# Patient Record
(demographics unavailable — no encounter records)

---

## 2025-01-23 NOTE — HISTORY OF PRESENT ILLNESS
[FreeTextEntry1] : Patient presents for follow-up of chronic headaches. Reports worsening headaches over the past month, partially attributed to temporary discontinuation of candesartan due to insurance coverage issues. Currently using [employer] program for medications. Sumatriptan remains effective for acute headache management, providing relief within 20 minutes of administration. Patient reports taking sumatriptan approximately twice per week when experiencing headaches, typically requiring 2 doses per headache episode (morning and night). Previously trialed UBRELVY which was ineffective. No new symptoms reported.

## 2025-07-16 NOTE — HISTORY OF PRESENT ILLNESS
[FreeTextEntry1] : Today: Ms. JAIME SHUKLA is a 48-year-old woman who presents for a neurologic follow up for her chronic headaches. Her prior history and physical have been reviewed. She reports improvement of her headaches over the summer. She has 2-3 headaches a month on average, which sometimes last for about 2 days at a time. She adds that sumatriptan remains effective for treating her headaches. She is requesting a refill of her medications as well as repeat testing, stating that her last MRI was a few years ago.      Previous Encounter: Patient presents for follow-up of chronic headaches. Reports worsening headaches over the past month, partially attributed to temporary discontinuation of candesartan due to insurance coverage issues. Currently using [employer] discount program for medications. Sumatriptan remains effective for acute headache management, providing relief within 20 minutes of administration. Patient reports taking sumatriptan approximately twice per week when experiencing headaches, typically requiring 2 doses per headache episode (morning and night). Previously trialed UBRELVY which was ineffective. No new symptoms reported.

## 2025-07-16 NOTE — ASSESSMENT
[FreeTextEntry1] : Chronic Migraine w Aura, Pineal Gland Cyst - MRI of the brain wo contrast - Renew sumatriptan 100 mg oral tablets, 1 tablet at the onset of headaches and repeat in 2 hours PRN  - Renew candesartan cilexetil 32 mg oral tablets, 1 tablet QD - Follow up in 6 months   Chronic Insomnia - Renew zolpidem tartrate 10 mg oral tablets, 1 tablet QN PRN   I, Miya Licona, attest that this documentation has been prepared under the direction and in the presence of Provider Blayne Andujar DO.   Thank you for allowing me to assist in the management of this patient.   Blayne Andujar DO Board Certified, Neurology.

## 2025-07-16 NOTE — HISTORY OF PRESENT ILLNESS
[FreeTextEntry1] : Today: Ms. JAIME SHUKLA is a 48-year-old woman who presents for a neurologic follow up for her chronic headaches. Her prior history and physical have been reviewed. She reports improvement of her headaches over the summer. She has 2-3 headaches a month on average, which sometimes last for about 2 days at a time. She adds that sumatriptan remains effective for treating her headaches. She is requesting a refill of her medications as well as repeat testing, stating that her last MRI was a few years ago.      Previous Encounter: Patient presents for follow-up of chronic headaches. Reports worsening headaches over the past month, partially attributed to temporary discontinuation of candesartan due to insurance coverage issues. Currently using [employer] discount program for medications. Sumatriptan remains effective for acute headache management, providing relief within 20 minutes of administration. Patient reports taking sumatriptan approximately twice per week when experiencing headaches, typically requiring 2 doses per headache episode (morning and night). Previously trialed UBRELVY which was ineffective. No new symptoms reported.